# Patient Record
Sex: MALE | Race: WHITE | NOT HISPANIC OR LATINO | Employment: FULL TIME | URBAN - METROPOLITAN AREA
[De-identification: names, ages, dates, MRNs, and addresses within clinical notes are randomized per-mention and may not be internally consistent; named-entity substitution may affect disease eponyms.]

---

## 2025-03-24 ENCOUNTER — OFFICE VISIT (OUTPATIENT)
Dept: PRIMARY CARE | Facility: CLINIC | Age: 55
End: 2025-03-24
Payer: COMMERCIAL

## 2025-03-24 VITALS
WEIGHT: 164 LBS | HEART RATE: 122 BPM | OXYGEN SATURATION: 96 % | HEIGHT: 69 IN | BODY MASS INDEX: 24.29 KG/M2 | DIASTOLIC BLOOD PRESSURE: 80 MMHG | SYSTOLIC BLOOD PRESSURE: 160 MMHG | TEMPERATURE: 98.7 F

## 2025-03-24 DIAGNOSIS — N20.0 KIDNEY STONES: ICD-10-CM

## 2025-03-24 DIAGNOSIS — R36.1 HEMATOSPERMIA: Primary | ICD-10-CM

## 2025-03-24 DIAGNOSIS — R10.32 LEFT GROIN PAIN: ICD-10-CM

## 2025-03-24 PROBLEM — M25.50 ARTHRALGIA OF MULTIPLE SITES: Status: ACTIVE | Noted: 2019-10-10

## 2025-03-24 PROBLEM — R10.11 RUQ ABDOMINAL PAIN: Status: ACTIVE | Noted: 2020-02-05

## 2025-03-24 PROBLEM — R03.0 BORDERLINE BLOOD PRESSURE: Status: ACTIVE | Noted: 2019-09-25

## 2025-03-24 PROBLEM — M79.642 PAIN IN LEFT HAND: Status: ACTIVE | Noted: 2019-10-10

## 2025-03-24 PROBLEM — S46.812A STRAIN OF LEFT TRAPEZIUS MUSCLE: Status: ACTIVE | Noted: 2022-04-28

## 2025-03-24 PROBLEM — R53.83 FATIGUE: Status: ACTIVE | Noted: 2019-10-10

## 2025-03-24 PROCEDURE — 3008F BODY MASS INDEX DOCD: CPT | Performed by: STUDENT IN AN ORGANIZED HEALTH CARE EDUCATION/TRAINING PROGRAM

## 2025-03-24 PROCEDURE — 1036F TOBACCO NON-USER: CPT | Performed by: STUDENT IN AN ORGANIZED HEALTH CARE EDUCATION/TRAINING PROGRAM

## 2025-03-24 PROCEDURE — 99203 OFFICE O/P NEW LOW 30 MIN: CPT | Performed by: STUDENT IN AN ORGANIZED HEALTH CARE EDUCATION/TRAINING PROGRAM

## 2025-03-24 ASSESSMENT — PATIENT HEALTH QUESTIONNAIRE - PHQ9
1. LITTLE INTEREST OR PLEASURE IN DOING THINGS: NOT AT ALL
2. FEELING DOWN, DEPRESSED OR HOPELESS: NOT AT ALL
SUM OF ALL RESPONSES TO PHQ9 QUESTIONS 1 AND 2: 0

## 2025-03-24 ASSESSMENT — PAIN SCALES - GENERAL: PAINLEVEL_OUTOF10: 3

## 2025-03-24 NOTE — PROGRESS NOTES
"                                                                                                                 GENERAL PROGRESS NOTE    Chief Complaint   Patient presents with    Pain     Left mayo/testicle for about 2 weeks. Blood in semen after sex/masturbation.         HPI  Faisal Oliveros is a 54 y.o. male     Recently new Rt ankle brace about a month ago. Has been walking different because of it. Shortly after noticed Lt groin pain.     On Friday was intimate with wife and again on Sunday noted traces of blood in semen. Has some pain radiating into testicle and seems to be larger than the right   No pain with intercourse or ejaculation. Was trace of blood through the ejaculate x 2.    No pain with urination. Does have history of kidney stones, last one was about 2 months ago. Did not seek medical treatment but seem to follow the normal course but does not recall it being expelled. Couple days ago had a slight twinge but did not seem like his normal stone pain.     Wife is only sexual partner. No pain with bowel movements.     No longer seeing urologist for kidney stones.     Vital signs   /80 (BP Location: Left arm, Patient Position: Sitting, BP Cuff Size: Adult)   Pulse (!) 122   Temp 37.1 °C (98.7 °F) (Temporal)   Ht 1.753 m (5' 9\")   Wt 74.4 kg (164 lb)   SpO2 96%   BMI 24.22 kg/m²      No current outpatient medications on file.     No current facility-administered medications for this visit.       Review of Systems     Physical Exam    {Assess/Plan SmartLinks (Optional) - DX aware test:42369}  ***    No orders of the defined types were placed in this encounter.      Rona Lucas MD  03/24/25  10:27 AM    " (Natalie Hassan LPN).   Constitutional:       Appearance: Normal appearance.   Abdominal:      General: Abdomen is flat.      Palpations: Abdomen is soft.      Tenderness: There is no abdominal tenderness.      Hernia: There is no hernia in the left inguinal area or right inguinal area.   Genitourinary:     Testes: Normal. Cremasteric reflex is present.         Right: Tenderness or swelling not present.         Left: Tenderness or swelling not present.   Skin:     General: Skin is warm and dry.   Neurological:      Mental Status: He is alert.         ASSESSMENT AND PLAN  1. Hematospermia (Primary)  2. Left groin pain  Unclear etiology. Will check imaging and UA. No evidence of torsion on exam. Return/ED precautions reviewed.   - Urinalysis with Reflex Culture and Microscopic; Future  - US renal complete; Future  - Urinalysis with Reflex Culture and Microscopic  - US scrotum w doppler; Future      3. Kidney stones  Will check US given his recent episode of pain similar to that of his typical nephrolithiasis.   - US renal complete; Future        Rona Lucas MD  03/24/25  10:27 AM

## 2025-03-26 ENCOUNTER — APPOINTMENT (OUTPATIENT)
Dept: RADIOLOGY | Facility: HOSPITAL | Age: 55
End: 2025-03-26
Payer: COMMERCIAL

## 2025-03-26 LAB
APPEARANCE UR: CLEAR
BACTERIA #/AREA URNS HPF: ABNORMAL /HPF
BACTERIA UR CULT: ABNORMAL
BILIRUB UR QL STRIP: NEGATIVE
COLOR UR: YELLOW
GLUCOSE UR QL STRIP: NEGATIVE
HGB UR QL STRIP: NEGATIVE
HYALINE CASTS #/AREA URNS LPF: ABNORMAL /LPF
KETONES UR QL STRIP: NEGATIVE
LEUKOCYTE ESTERASE UR QL STRIP: ABNORMAL
NITRITE UR QL STRIP: NEGATIVE
PH UR STRIP: 8 [PH] (ref 5–8)
PROT UR QL STRIP: NEGATIVE
RBC #/AREA URNS HPF: ABNORMAL /HPF
SERVICE CMNT-IMP: ABNORMAL
SP GR UR STRIP: 1.02 (ref 1–1.03)
SQUAMOUS #/AREA URNS HPF: ABNORMAL /HPF
WBC #/AREA URNS HPF: ABNORMAL /HPF

## 2025-03-27 ENCOUNTER — HOSPITAL ENCOUNTER (OUTPATIENT)
Dept: RADIOLOGY | Facility: HOSPITAL | Age: 55
Discharge: HOME | End: 2025-03-27
Payer: COMMERCIAL

## 2025-03-27 DIAGNOSIS — R10.32 LEFT GROIN PAIN: ICD-10-CM

## 2025-03-27 DIAGNOSIS — R36.1 HEMATOSPERMIA: ICD-10-CM

## 2025-03-27 DIAGNOSIS — N20.0 KIDNEY STONES: ICD-10-CM

## 2025-03-27 PROCEDURE — 76870 US EXAM SCROTUM: CPT

## 2025-03-27 PROCEDURE — 76770 US EXAM ABDO BACK WALL COMP: CPT

## 2025-03-31 ENCOUNTER — TELEPHONE (OUTPATIENT)
Dept: PRIMARY CARE | Facility: CLINIC | Age: 55
End: 2025-03-31
Payer: COMMERCIAL

## 2025-03-31 NOTE — TELEPHONE ENCOUNTER
Phone call with pt, results of imaging reviewed. Non-obstructing renal stones. No hydronephrosis. Scrotal US normal except for small bilateral varicocele. Has started wearing supportive underwear and discomfort is nearly gone. Has had intercourse since last week and has not seen any blood in semen. Given resolution of symptoms will monitor with plan to refer to urology for additional work up if recurs.

## 2025-04-07 ASSESSMENT — ENCOUNTER SYMPTOMS
FEVER: 0
DYSURIA: 0
BLOOD IN STOOL: 0
FREQUENCY: 0
HEMATURIA: 0
DIFFICULTY URINATING: 0
SHORTNESS OF BREATH: 0
CHILLS: 0
ABDOMINAL PAIN: 0
RECTAL PAIN: 0